# Patient Record
Sex: MALE | Race: WHITE | NOT HISPANIC OR LATINO | URBAN - METROPOLITAN AREA
[De-identification: names, ages, dates, MRNs, and addresses within clinical notes are randomized per-mention and may not be internally consistent; named-entity substitution may affect disease eponyms.]

---

## 2017-05-01 PROBLEM — N39.0 UTI (URINARY TRACT INFECTION): Status: ACTIVE | Noted: 2017-05-01

## 2017-05-07 PROBLEM — N40.0 BENIGN PROSTATIC HYPERPLASIA: Status: ACTIVE | Noted: 2017-05-07

## 2017-05-07 PROBLEM — Z87.438 HISTORY OF PROSTATITIS: Status: ACTIVE | Noted: 2017-05-07

## 2017-05-07 PROBLEM — E66.01 MORBID OBESITY WITH BODY MASS INDEX (BMI) OF 40.0 TO 44.9 IN ADULT: Status: ACTIVE | Noted: 2017-05-07

## 2017-05-18 PROBLEM — E29.1 HYPOGONADISM IN MALE: Status: ACTIVE | Noted: 2017-05-18

## 2017-05-18 PROBLEM — E78.5 HYPERLIPIDEMIA: Status: ACTIVE | Noted: 2017-05-18

## 2017-05-18 PROBLEM — R89.4 POSITIVE TEST FOR HERPES SIMPLEX VIRUS (HSV) ANTIBODY: Status: ACTIVE | Noted: 2017-05-18

## 2017-05-18 PROBLEM — R97.20 ELEVATED PSA: Status: ACTIVE | Noted: 2017-05-18

## 2018-03-08 DIAGNOSIS — M25.562 PAIN IN LEFT KNEE: Primary | ICD-10-CM

## 2018-03-19 ENCOUNTER — CLINICAL SUPPORT (OUTPATIENT)
Dept: REHABILITATION | Facility: HOSPITAL | Age: 63
End: 2018-03-19
Payer: MEDICAID

## 2018-03-19 DIAGNOSIS — M25.662 DECREASED ROM OF LEFT KNEE: ICD-10-CM

## 2018-03-19 DIAGNOSIS — M25.562 PAIN IN JOINT OF LEFT KNEE: ICD-10-CM

## 2018-03-19 DIAGNOSIS — R26.89 GAIT, ANTALGIC: ICD-10-CM

## 2018-03-19 DIAGNOSIS — M25.462 SWELLING OF JOINT, KNEE, LEFT: ICD-10-CM

## 2018-03-19 DIAGNOSIS — M54.50 LUMBAR PAIN WITH RADIATION DOWN LEFT LEG: ICD-10-CM

## 2018-03-19 DIAGNOSIS — M79.605 LUMBAR PAIN WITH RADIATION DOWN LEFT LEG: ICD-10-CM

## 2018-03-19 PROCEDURE — 97162 PT EVAL MOD COMPLEX 30 MIN: CPT | Mod: PN

## 2018-03-19 PROCEDURE — 97110 THERAPEUTIC EXERCISES: CPT | Mod: PN

## 2018-03-19 NOTE — PROGRESS NOTES
See full Physical Therapy Evaluation in POC     Review of patient's allergies indicates:  No Known Allergies  Precautions: Standard     Evaluation Date: 3/19/2018  Visit # authorized: 20  Authorization period: Pain in left knee    Functional Limitations Reports - G Codes  Category: Mobility   Intake 62% Current Status CL -    Predicted 48% Goal Status+ CK -     TREATMENT:  Sita received therapeutic exercises to develop strength and endurance, flexibility for 15 minutes including:  Quad sets 20 reps 5 sec holds   TrA 20 reps 5 sec holds   Sciatic nerve glides 20 reps x 2   Supine sciatic nerve glides 20 reps     HEP provided: Patient was given the above exercises to be completed 3 - 4 times a week   Instructed pt. Regarding: Proper technique with all exercises. Pt demo good understanding of the education provided. Sita demonstrated good return demonstration of activities.    Prognosis: Fair    Anticipated barriers to physical therapy: Chronic condition     Medical necessity is demonstrated by the following IMPAIRMENTS/PROBLEM LIST:   1) Increase in pain level limiting function    2) Decreased ROM of L knee   3) Antalgic gait   4) Swelling present in joint    5) Lack of HEP    GOALS: Short Term Goals:  6 weeks  1. Patient will be able to report decreased in resting left knee pain  <   / =  6  /10  to increase tolerance for walking and ADLs.    2. Patient will be able to report an improvement in ability to lift leg into shower to improve quality of life.   3. Patient will demonstrate an increased MMT in left hip abduction to 4/5  to increase tolerance for walking and standing.  4. Patient will be able to demonstrate an increase in 5 degrees of hamstring flexibility on L LE to improve mobility.   5. Patient will be able to tolerate HEP to improve ROM and independence with ADL's    Long Term Goals: 12 weeks  1. Patient will be able to report decreased in resting left knee pain  <   / =  4  /10  to increase  tolerance for walking and ADLs.    2. Patient will be able to report an improvement in ability to lift leg into shower to improve quality of life.   3. Patient will demonstrate an increased MMT in left hip abduction to 4+/5  to increase tolerance for walking and standing.  4. Patient will be able to demonstrate an increase in 10 degrees of hamstring flexibility on L LE to improve mobility.    5. Patient to be Independent with HEP to improve ROM and independence with ADL's

## 2018-03-19 NOTE — PLAN OF CARE
Physical Therapy Evaluation    Name: Sita Melendez  Clinic Number: [id number]    Diagnosis:   Encounter Diagnoses   Name Primary?    Lumbar pain with radiation down left leg     Decreased ROM of left knee     Pain in joint of left knee     Gait, antalgic     Swelling of joint, knee, left      Physician: Aden Garnica*  Treatment Orders: PT Eval and Treat    Past Medical History:   Diagnosis Date    Erectile dysfunction     Hyperlipidemia      Current Outpatient Prescriptions   Medication Sig    atorvastatin (LIPITOR) 40 MG tablet Take 1 tablet (40 mg total) by mouth once daily.    tamsulosin (FLOMAX) 0.4 mg Cp24 Take 1 capsule (0.4 mg total) by mouth once daily.    valacyclovir (VALTREX) 1000 MG tablet Take 1 tablet (1,000 mg total) by mouth 2 (two) times daily.     No current facility-administered medications for this visit.      Review of patient's allergies indicates:  No Known Allergies  Precautions: Standard     Evaluation Date: 3/19/2018  Visit # authorized: 20  Authorization period: Pain in left knee    Salomon Buckner is a 63 y.o. male that presents to [facility] outpatient clinic secondary to left knee pain. Patient indicates that this problem started about 6 months ago it really started hurting and he could not  his leg. Patient indicates that he had some naproxen prescribed from the doctor that helped the knee but not the back. Patient indicates that he has degenerative disc disease. Patient indicates pain in the back of the knee since he has been on the steroids on the left side around the sciatic nerve that shoots right down my leg. Patient indicates that it was just the knee and pulling the knee now it seems like two different pains    Patient c/o: intermittent symptoms  Radicular symptoms: Pain just to the back of his knee   Onset: gradual   Pain Scale: Rates pain on a scale of 0-10 to be 10 at worst; 8 currently; 5 at best .  Aggravating factors: patient indicates that  the pain in the back will occur when he is sleeping, patient indicates at night he has to straighten his leg out   Relieving factors: Laying down on a heat pad or have a friend get a rub down but it never stays, it will only stay down for a little while    Previous treatment: Steroid shots, patient indicates he has had sessions with a chiropractor and [facility],  [facility]   Imaging: none currently in chart for episode, patient indicated a desire to get another x-ray due to current pain symptoms that feels like something is loose   Past surgical history: Hernia inguinal from lifting   Functional deficits: Shower, walking, working, getting in the yard, walking on the leve, if he does perform these activities it takes a day to recover from activity   Prior level of function: First DDD 1982 but he was able to function,   Occupation: Heavy motor mechanics , work duties include: heavy lifting, patient indicates that he is currently out of work due to not being able to pass physical   Environment: 1 story home with 4 steps to enter, patient indicates that he is starting to have a bit of problem with the steps in his house   No cultural or spiritual barriers identified to treatment or learning.  Activity level/Participation: Sedentary   Patient's goals: To be normal and function as he can so he will not be scared to travel and take on the walks and normal functioning person       Objective     Observation:    Gait: Antalgic gait with decreased stance time present on left LE    Alignment: Increase in knee varus      Range of Motion:   Knee Left active Left Passive   Flexion 100 102   Extension -5      Knee Right active Right Passive   Flexion 110 110   Extension -5        Lower Extremity Strength  Right LE  Left LE    Knee extension: 5/5 Knee extension: 4/5   Knee flexion: 5/5 Knee flexion: 4/5   Hip flexion: 5/5 Hip flexion: 4+/5   Hip extension:  5/5 Hip extension: 4-/5   Hip abduction:  5/5 Hip abduction: 4-/5   Hip adduction: 5/5 Hip adduction 4-/5   Ankle dorsiflexion: 5/5 Ankle dorsiflexion: 5/5   Ankle plantarflexion: 5/5 Ankle plantarflexion: 4+/5       Special Tests:   Right Left   Valgus Stress Test Negative Negative   Varus Stress test Negative Negative   Lachman's test Negative Negative   Posterior Drawer Negative Negative   Anterior Drawer Negative Negative    Sravan's Test Negative negative   Apley's Compression Negative Positive   Apley's Distraction Negative Positive   Darling's compression test Negative Positive      Joint Mobility:   Patellar sup./inf:  Limited bilaterally    Patellar med/lat: Limited bilaterally     Palpation:    Crepitus: Present    Popliteal fossa: Patient in the posterior aspect of the knee    Quad contraction: Weak     Sensation: Intact bilateral sensation     Flexibility:      Right Left   Ely's Test  Slight Poor    Supine 90/90 WNL -20   Emily's Test Negative Negative      Slump testing: Positive on the left side  Supine SLR test: Positive on the left side     Edema: ( Joint line of knee)   42 cm right, 44 cm left       Functional Limitations Reports - G Codes  Category: Mobility   Intake 62% Current Status CL -    Predicted 48% Goal Status+ CK -     PT Evaluation Completed? Yes  Discussed Plan of Care with patient: Yes    TREATMENT:  Sita received therapeutic exercises to develop strength and endurance, flexibility for 15 minutes including:  Quad sets 20 reps 5 sec holds   TrA 20 reps 5 sec holds   Sciatic nerve glides 20 reps x 2   Supine sciatic nerve glides 20 reps     HEP provided: Patient was given the above exercises to be completed 3 - 4 times a week   Instructed pt. Regarding: Proper technique with all exercises. Pt demo good understanding of the education provided. Sita demonstrated good return demonstration of activities.      Assessment     This is a 63 y.o. male referred to outpatient physical therapy and presents with a medical  diagnosis of left knee pain and demonstrates limitations as described in the problem list. Patient presents to the clinic with multiple complaints including knee pain and back pain that radiates down into the back of his knee. Patient presents with a positive SLR and Slump testing indicating positive neurological tension. Patient also has some symptoms that radiates down to the popliteal area of his left knee. Patient also presents with decreased reflexes on the left achilles compared to the right. Patient will benefit from neurological and muscular stretching and mobility to improve his joint motion. Patient will also benefit from LE strengthening activities to improve walking tolerance and reduce antalgic gait.  Patient will benefit from physcial therapy services in order to maximize pain free and/or functional use of left knee and improvement in lumbar motion. The following goals were discussed with the patient and patient is in agreement with them as to be addressed in the treatment plan. Patient was given a HEP consisting of exercises listed above. Patient verbally understood the instructions as they were given and demonstrated proper form and technique during therapy. Patient was advised to perform these exercises free of pain, and to stop performing them if pain occurs. Patient would benefit from skilled PT to address above stated problems, as well as, achieve pt goals within a timely manner. Patient has set realistic goals and has verbalized good understanding and agreement with reported diagnosis, prognosis and treatment. Patient demonstrates no additional cultural, spiritual or educational need and currently has no barriers to learning.      History  Co-morbidities and personal factors that may impact the plan of care Examination  Body Structures and Functions, activity limitations and participation restrictions that may impact the plan of care Clinical Presentation   Decision Making/ Complexity Score    Co-morbidities:   Erectile dysfunction  Hyperlipidemia       Personal Factors:   Age 63  Occupation: Used to be a  but is now unable to perform his work duties due to pain  Lifestyle: Sedentary due to pain and limitations    Attitudes: Pleasant    Body Regions: Lumbar, LE     Body Systems: Musculoskeletal (symmetry, ROM, strength, flexibility, joint mobility), Neuromuscular (coordination, posture, balance, gait, transfers, motor control/learning), Cardiovascular (endurance)    Activity limitations: Limited in daily activities such as showering, putting on shoes and socks    Participation Restrictions: Patient unable to engage in activities that require walking or he will have pain for additional days after    Changing clinical presentation with changing clinical characteristics    Pain: 8/10 current pain   Complexity: Moderate     Functional Outcome measure  FOTO limitation: 62 % disability       Prognosis: Fair    Anticipated barriers to physical therapy: Chronic condition     Medical necessity is demonstrated by the following IMPAIRMENTS/PROBLEM LIST:   1) Increase in pain level limiting function    2) Decreased ROM of L knee   3) Antalgic gait   4) Swelling present in joint    5) Lack of HEP    GOALS: Short Term Goals:  6 weeks  1. Patient will be able to report decreased in resting left knee pain  <   / =  6  /10  to increase tolerance for walking and ADLs.    2. Patient will be able to report an improvement in ability to lift leg into shower to improve quality of life.   3. Patient will demonstrate an increased MMT in left hip abduction to 4/5  to increase tolerance for walking and standing.  4. Patient will be able to demonstrate an increase in 5 degrees of hamstring flexibility on L LE to improve mobility.   5. Patient will be able to tolerate HEP to improve ROM and independence with ADL's    Long Term Goals: 12 weeks  1. Patient will be able to report decreased in resting left knee pain  <   / =  4   /10  to increase tolerance for walking and ADLs.    2. Patient will be able to report an improvement in ability to lift leg into shower to improve quality of life.   3. Patient will demonstrate an increased MMT in left hip abduction to 4+/5  to increase tolerance for walking and standing.  4. Patient will be able to demonstrate an increase in 10 degrees of hamstring flexibility on L LE to improve mobility.    5. Patient to be Independent with HEP to improve ROM and independence with ADL's        Plan     Patient will be treated by physical therapy 1-3 times a week for 12 weeks for Electrical Stimulation PRN, Iontophoresis (with dexamethasone PRN), Manual Therapy, Moist Heat/ Ice, Neuromuscular Re-ed, Patient Education, Therapeutic Activites, Therapeutic Exercise and Other therapeutic taping, dry needling, aquatic therapy to achieve established goals. Sita may at times be seen by a PTA as part of the Rehab Team.      Cont PT for 12 weeks.      I certify the need for these services furnished under this plan of treatment and while under my care.______________________________ Physician/Referring Practitioner  Date of Signature      Emmanuelle Marrero, PT  3/19/2018

## 2018-03-22 DIAGNOSIS — M54.50 LUMBAGO: Primary | ICD-10-CM

## 2018-04-05 ENCOUNTER — CLINICAL SUPPORT (OUTPATIENT)
Dept: REHABILITATION | Facility: HOSPITAL | Age: 63
End: 2018-04-05
Payer: MEDICAID

## 2018-04-05 DIAGNOSIS — R26.89 GAIT, ANTALGIC: ICD-10-CM

## 2018-04-05 DIAGNOSIS — M25.662 DECREASED ROM OF LEFT KNEE: ICD-10-CM

## 2018-04-05 DIAGNOSIS — M25.462 SWELLING OF JOINT, KNEE, LEFT: ICD-10-CM

## 2018-04-05 DIAGNOSIS — M25.562 PAIN IN JOINT OF LEFT KNEE: ICD-10-CM

## 2018-04-05 DIAGNOSIS — M54.50 LUMBAR PAIN WITH RADIATION DOWN LEFT LEG: ICD-10-CM

## 2018-04-05 DIAGNOSIS — M79.605 LUMBAR PAIN WITH RADIATION DOWN LEFT LEG: ICD-10-CM

## 2018-04-05 PROCEDURE — 97110 THERAPEUTIC EXERCISES: CPT | Mod: PN

## 2018-04-05 NOTE — PROGRESS NOTES
Physical Therapy Daily Note   Name: Sita Melendez  Clinic Number: [id number]    Diagnosis:   Encounter Diagnoses   Name Primary?    Lumbar pain with radiation down left leg     Decreased ROM of left knee     Pain in joint of left knee     Gait, antalgic     Swelling of joint, knee, left      Physician: Aden Garnica*  Treatment Orders: PT Eval and Treat    Precautions: Standard   Visit #: 2 of 20  Eval date: 3/19/2018   G-code reported: Initial Evaluation (Visit #1)   Certification period: (3/19/2018 -6/11/2018) PN Due (4/19/2018)    Subjective   Sita reports: that he is sore today. Patient indicates that he is feeling a good bit pain in his left knee today. Patient indicates that he is not able to     Pain Scale:  7/10      Objective     Time In: 0830  Time Out:: 0930  Total Treatment time: 60 minutes (1:1 with PT for 30' of treatment session)     Patient performed the following therapeutic exercises for 50 minutes in order to strengthen and stretch left LE and Lumbar region:  LTR 3 minutes  Double knee to chest 3 minutes   Quad sets 20 reps 5 sec holds  SLR 2 sets of 10 reps   SL hip abduction 2 sets of 10 reps   SL Clams 2 sets of 15 reps   Heel slides c towel 3 minutes (10 second holds)     Heat placed on lumbar region for 3 minutes at the start of therapy session today.     Ice pack was placed on patient left knee for 10 minutes following exercises in clinic today to reduce any presence or onset of inflammation.     Written Home Exercises Provided: Patient educated to continue with previously issued HEP in order to complement therapy sessions.   Exercises were reviewed and Sita was able to demonstrate them prior to the end of the session. Patient received a written copy of exercises to perform at home. Sita demonstrated good  understanding of the education provided.     Assessment     Sita is progressing well towards his goals. Patient  demonstrates accuracy in her HEP exercises demonstrated and performed in clinic today. Patient was unable to tolerate bike exercise during today's treatment session and will refrain from this exercises for the time being. Patient tolerated motion well today because indicates greatest discomfort comes from the posterior aspect of the knee indicating poor stability. Patient will benefit from exercises that promote greater control over his knee with walking and standing activities. Patient will continue to benefit from skilled PT services in order to address limitations present in problem list and education on proper advancement in HEP.       Pt's spiritual, cultural and educational needs considered and pt agreeable to plan of care and goals.    Prognosis: Fair    Anticipated barriers to physical therapy: Chronic condition     Medical necessity is demonstrated by the following IMPAIRMENTS/PROBLEM LIST:   1) Increase in pain level limiting function    2) Decreased ROM of L knee   3) Antalgic gait   4) Swelling present in joint    5) Lack of HEP    GOALS: Short Term Goals:  6 weeks  1. Patient will be able to report decreased in resting left knee pain  <   / =  6  /10  to increase tolerance for walking and ADLs.    2. Patient will be able to report an improvement in ability to lift leg into shower to improve quality of life.   3. Patient will demonstrate an increased MMT in left hip abduction to 4/5  to increase tolerance for walking and standing.  4. Patient will be able to demonstrate an increase in 5 degrees of hamstring flexibility on L LE to improve mobility.   5. Patient will be able to tolerate HEP to improve ROM and independence with ADL's    Long Term Goals: 12 weeks  1. Patient will be able to report decreased in resting left knee pain  <   / =  4  /10  to increase tolerance for walking and ADLs.    2. Patient will be able to report an improvement in ability to lift leg into shower to improve quality of life.    3. Patient will demonstrate an increased MMT in left hip abduction to 4+/5  to increase tolerance for walking and standing.  4. Patient will be able to demonstrate an increase in 10 degrees of hamstring flexibility on L LE to improve mobility.    5. Patient to be Independent with HEP to improve ROM and independence with ADL's      Plan   Continue with established Plan of Care towards PT goals.       Emmanuelle DÍAZ Janes, PT  4/5/2018

## 2018-04-10 ENCOUNTER — CLINICAL SUPPORT (OUTPATIENT)
Dept: REHABILITATION | Facility: HOSPITAL | Age: 63
End: 2018-04-10
Payer: MEDICAID

## 2018-04-10 DIAGNOSIS — M79.605 LUMBAR PAIN WITH RADIATION DOWN LEFT LEG: ICD-10-CM

## 2018-04-10 DIAGNOSIS — M25.562 PAIN IN JOINT OF LEFT KNEE: ICD-10-CM

## 2018-04-10 DIAGNOSIS — M54.50 LUMBAR PAIN WITH RADIATION DOWN LEFT LEG: ICD-10-CM

## 2018-04-10 DIAGNOSIS — M25.662 DECREASED ROM OF LEFT KNEE: ICD-10-CM

## 2018-04-10 DIAGNOSIS — R26.89 GAIT, ANTALGIC: ICD-10-CM

## 2018-04-10 DIAGNOSIS — M25.462 SWELLING OF JOINT, KNEE, LEFT: ICD-10-CM

## 2018-04-10 PROCEDURE — 97110 THERAPEUTIC EXERCISES: CPT | Mod: PN

## 2018-04-10 PROCEDURE — 97010 HOT OR COLD PACKS THERAPY: CPT | Mod: PN

## 2018-04-10 NOTE — PROGRESS NOTES
Physical Therapy Daily Note   Name: Sita Melendez  Clinic Number: [id number]    Diagnosis:   Encounter Diagnoses   Name Primary?    Lumbar pain with radiation down left leg     Decreased ROM of left knee     Pain in joint of left knee     Gait, antalgic     Swelling of joint, knee, left      Physician: Aden Garnica*  Treatment Orders: PT Eval and Treat    Precautions: Standard   Visit #: 3 of 20  Eval date: 3/19/2018   G-code reported: Initial Evaluation (Visit #1)   Certification period: (3/19/2018 -6/11/2018) PN Due (4/19/2018)    Subjective   Sita reports: that his knee has been giving him about the same amount of discomfort that it has been. Patient indicates that he did feel a little better following last treatment session but it did not last very long.     Pain Scale:  7/10 in left knee primary       Objective     Time In: 0830  Time Out:: 0930  Total Treatment time: 60 minutes (1:1 with PT for 30' of treatment session)     Patient performed the following therapeutic exercises for 50 minutes in order to strengthen and stretch left LE and Lumbar region:  LTR 3 minutes  Double knee to chest 3 minutes   Quad sets 20 reps 5 sec holds  +Hamstring sets 20 reps 5 second holds   SLR 3 sets of 10 reps   SL hip abduction 2 sets of 10 reps   SL Clams 2 sets of 15 reps c red  Heel slides c towel 3 minutes (10 second holds)   Prone hamstring curl 2 sets of 15 reps   +Prone quad sets 2 sets of 10 reps (discomfort in back of knee)   +Seated hamstring curls 2 sets of 10 reps c red TB    Heat placed on lumbar region for 00 minutes at the start of therapy session today.     Ice pack was placed on patient left knee for 10 minutes following exercises in clinic today to reduce any presence or onset of inflammation.     Written Home Exercises Provided: Patient educated to continue with previously issued HEP in order to complement therapy sessions.   Exercises were  reviewed and Sita was able to demonstrate them prior to the end of the session. Patient received a written copy of exercises to perform at home. Sita demonstrated good  understanding of the education provided.     Assessment     Sita is progressing well towards his goals. Patient still demonstrated poor tolerance to nu step exercise during today's treatment session. Patient did not develop the discomfort until he had been on the machine for a while. Patient also showed a poor tolerance to prone quad sets during today's treatment session. Patient had fair tolerance to exercises performed in clinic today. Patient will benefit from exercises that promote greater control over his knee with walking and standing activities. Patient will continue to benefit from skilled PT services in order to address limitations present in problem list and education on proper advancement in HEP.       Pt's spiritual, cultural and educational needs considered and pt agreeable to plan of care and goals.    Prognosis: Fair    Anticipated barriers to physical therapy: Chronic condition     Medical necessity is demonstrated by the following IMPAIRMENTS/PROBLEM LIST:   1) Increase in pain level limiting function    2) Decreased ROM of L knee   3) Antalgic gait   4) Swelling present in joint    5) Lack of HEP    GOALS: Short Term Goals:  6 weeks  1. Patient will be able to report decreased in resting left knee pain  <   / =  6  /10  to increase tolerance for walking and ADLs.    2. Patient will be able to report an improvement in ability to lift leg into shower to improve quality of life.   3. Patient will demonstrate an increased MMT in left hip abduction to 4/5  to increase tolerance for walking and standing.  4. Patient will be able to demonstrate an increase in 5 degrees of hamstring flexibility on L LE to improve mobility.   5. Patient will be able to tolerate HEP to improve ROM and independence with ADL's    Long Term Goals: 12  weeks  1. Patient will be able to report decreased in resting left knee pain  <   / =  4  /10  to increase tolerance for walking and ADLs.    2. Patient will be able to report an improvement in ability to lift leg into shower to improve quality of life.   3. Patient will demonstrate an increased MMT in left hip abduction to 4+/5  to increase tolerance for walking and standing.  4. Patient will be able to demonstrate an increase in 10 degrees of hamstring flexibility on L LE to improve mobility.    5. Patient to be Independent with HEP to improve ROM and independence with ADL's      Plan   Continue with established Plan of Care towards PT goals.       Emmanuelle DÍAZ [facility], PT  4/10/2018

## 2018-04-17 ENCOUNTER — CLINICAL SUPPORT (OUTPATIENT)
Dept: REHABILITATION | Facility: HOSPITAL | Age: 63
End: 2018-04-17
Payer: MEDICAID

## 2018-04-17 DIAGNOSIS — M79.605 LUMBAR PAIN WITH RADIATION DOWN LEFT LEG: ICD-10-CM

## 2018-04-17 DIAGNOSIS — M54.50 LUMBAR PAIN WITH RADIATION DOWN LEFT LEG: ICD-10-CM

## 2018-04-17 DIAGNOSIS — R26.89 GAIT, ANTALGIC: ICD-10-CM

## 2018-04-17 DIAGNOSIS — M25.662 DECREASED ROM OF LEFT KNEE: ICD-10-CM

## 2018-04-17 DIAGNOSIS — M25.562 PAIN IN JOINT OF LEFT KNEE: ICD-10-CM

## 2018-04-17 DIAGNOSIS — M25.462 SWELLING OF JOINT, KNEE, LEFT: ICD-10-CM

## 2018-04-17 PROCEDURE — 97110 THERAPEUTIC EXERCISES: CPT | Mod: PN

## 2018-04-17 PROCEDURE — 97010 HOT OR COLD PACKS THERAPY: CPT | Mod: PN

## 2018-04-17 NOTE — PROGRESS NOTES
"                                                    Physical Therapy Daily Note   Name: Sita Melendez  Clinic Number: [id number]    Diagnosis:   Encounter Diagnoses   Name Primary?    Lumbar pain with radiation down left leg     Decreased ROM of left knee     Pain in joint of left knee     Gait, antalgic     Swelling of joint, knee, left      Physician: Aden Garnica*  Treatment Orders: PT Eval and Treat    Precautions: Standard   Visit #: 4 of 20  Eval date: 3/19/2018   G-code reported: Initial Evaluation (Visit #1)   Certification period: (3/19/2018 -6/11/2018) PN Due (5/17/2018)    Subjective   Sita reports: that his knee has been giving him about the same amount of discomfort that it has been. Patient indicates that he did feel a little better following last treatment session but it did not last very long.     Pain Scale:  7/10 in left knee primary       Objective     Time In: 0830  Time Out:: 0940  Total Treatment time: 70 minutes (1:1 with PT for duration of treatment session: 10 minutes ice)     Patient performed the following therapeutic exercises for 50 minutes in order to strengthen and stretch left LE and Lumbar region:  +Hamstring stretch 3 times 30 seconds   LTR 3 minutes  Double knee to chest 3 minutes   Quad sets 20 reps 5 sec holds  Hamstring sets 20 reps 5 second holds   SLR 3 sets of 10 reps   SL hip abduction 2 sets of 10 reps   SL Clams 2 sets of 15 reps c red  Heel slides c towel 3 minutes (10 second holds)   Prone hamstring curl 2 sets of 15 reps   Prone quad sets 2 sets of 10 reps  Seated hamstring curls 2 sets of 10 reps c red TB  +Step ups on 4" step 20 reps     Heat placed on lumbar region for 00 minutes at the start of therapy session today.     Ice pack was placed on patient left knee for 10 minutes following exercises in clinic today to reduce any presence or onset of inflammation.     Written Home Exercises Provided: Patient educated to continue with previously issued " HEP in order to complement therapy sessions.   Exercises were reviewed and Sita was able to demonstrate them prior to the end of the session. Patient received a written copy of exercises to perform at home. Sita demonstrated good  understanding of the education provided.        Range of Motion:   Knee Left active Left Passive   Flexion 100 102   Extension -5        Knee Right active Right Passive   Flexion 110 110   Extension -5           Lower Extremity Strength  Right LE   Left LE     Knee extension: 5/5 Knee extension: 5/5   Knee flexion: 5/5 Knee flexion: 5/5   Hip flexion: 5/5 Hip flexion: 5/5   Hip extension:  5/5 Hip extension: 4+/5   Hip abduction: 5/5 Hip abduction: 4+/5   Hip adduction: 5/5 Hip adduction 5/5   Ankle dorsiflexion: 5/5 Ankle dorsiflexion: 5/5   Ankle plantarflexion: 5/5 Ankle plantarflexion: 5/5        Flexibility:        Right Left   Ely's Test  Slight Poor    Supine 90/90 WNL -20   Emily's Test Negative Negative        Assessment     Sita is progressing well towards his goals. Patient engaged in exercises to promote further motor control of the knee joint. Patient attempted step up on    Patient will benefit from exercises that promote greater control over his knee with walking and standing activities. Patient will continue to benefit from skilled PT services in order to address limitations present in problem list and education on proper advancement in HEP.     Patient was reassessed during today's treatment session. Patient still presents with limitations present in ROM and strength in his left Le. Patient is most limited with his knee and the pain this brings throughout the day. Patient demonstrates poor control with his hamstring causing shifting in the knee joint. Patient will continue to benefit from activities that reduce compression in the joint and better motor control of the knee joint.     Pt's spiritual, cultural and educational needs considered and pt agreeable to plan of  care and goals.    Prognosis: Fair    Anticipated barriers to physical therapy: Chronic condition     Medical necessity is demonstrated by the following IMPAIRMENTS/PROBLEM LIST:   1) Increase in pain level limiting function    2) Decreased ROM of L knee   3) Antalgic gait   4) Swelling present in joint    5) Lack of HEP    GOALS: Short Term Goals:  6 weeks  1. Patient will be able to report decreased in resting left knee pain  <   / =  6  /10  to increase tolerance for walking and ADLs.  - In progress   2. Patient will be able to report an improvement in ability to lift leg into shower to improve quality of life. - In progress   3. Patient will demonstrate an increased MMT in left hip abduction to 4/5  to increase tolerance for walking and standing. - MET (4/17/2018)   4. Patient will be able to demonstrate an increase in 5 degrees of hamstring flexibility on L LE to improve mobility. - IN progress   5. Patient will be able to tolerate HEP to improve ROM and independence with ADL's - MET (4/17/2018)    Long Term Goals: 12 weeks  1. Patient will be able to report decreased in resting left knee pain  <   / =  4  /10  to increase tolerance for walking and ADLs.    2. Patient will be able to report an improvement in ability to lift leg into shower to improve quality of life.   3. Patient will demonstrate an increased MMT in left hip abduction to 4+/5  to increase tolerance for walking and standing.  4. Patient will be able to demonstrate an increase in 10 degrees of hamstring flexibility on L LE to improve mobility.    5. Patient to be Independent with HEP to improve ROM and independence with ADL's      Plan   Continue with established Plan of Care towards PT goals.       Emmanuelle Marrero, PT  4/17/2018

## 2018-05-08 ENCOUNTER — CLINICAL SUPPORT (OUTPATIENT)
Dept: REHABILITATION | Facility: HOSPITAL | Age: 63
End: 2018-05-08
Payer: MEDICAID

## 2018-05-08 DIAGNOSIS — M54.50 LUMBAR PAIN WITH RADIATION DOWN LEFT LEG: ICD-10-CM

## 2018-05-08 DIAGNOSIS — M25.462 SWELLING OF JOINT, KNEE, LEFT: ICD-10-CM

## 2018-05-08 DIAGNOSIS — M25.562 PAIN IN JOINT OF LEFT KNEE: ICD-10-CM

## 2018-05-08 DIAGNOSIS — R26.89 GAIT, ANTALGIC: ICD-10-CM

## 2018-05-08 DIAGNOSIS — M79.605 LUMBAR PAIN WITH RADIATION DOWN LEFT LEG: ICD-10-CM

## 2018-05-08 DIAGNOSIS — M25.662 DECREASED ROM OF LEFT KNEE: ICD-10-CM

## 2018-05-08 PROCEDURE — 97110 THERAPEUTIC EXERCISES: CPT | Mod: PN

## 2018-05-08 NOTE — PROGRESS NOTES
"                                                    Physical Therapy Daily Note   Name: Sita Melendez  Clinic Number: [id number]    Diagnosis:   Encounter Diagnoses   Name Primary?    Lumbar pain with radiation down left leg     Decreased ROM of left knee     Pain in joint of left knee     Gait, antalgic     Swelling of joint, knee, left      Physician: Aden Garnica*  Treatment Orders: PT Eval and Treat    Precautions: Standard   Visit #: 5 of 20  Eval date: 3/19/2018   G-code reported: Initial Evaluation (Visit #1)   Certification period: 3/19/2018 - 6/11/2018 (PN Due 5/17/2018)    Subjective     Sita reports: getting the results of his MRI for his Left knee and low back. Patient states that he is being referred to neurosurgery for his back first and then an orthopedic for his knee. Patient reports a lapse in PT secondary to have doctors appointments and MRI's scheduled. Patient reports some compliance with his HEP stating "I'm not sure if it's helping or hindering me."  Pain Scale:  4-5 out of 10 currently, L knee and low back.     Objective     Time In: 0825  Time Out: 0930  Total Treatment Time: 55 minutes (1:1 with PTA for 35 minutes)    MRI of lumbar spine results (5/1/2018):  1. 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   2. Moderate to severe central canal stenosis at L3-4 and to a lesser extent L4-5, and some lesser posterior foraminal encroachment on the left is noted at L4-5.  3. Small posterior left para midline disc extrusion at L5-S1 with overlying mild nerve root contact in the left lateral recess region.  4. Suspected degenerative subchondral cysts involving the iliac side of the upper right SI joint, and large body habitus with some epidural lipomatosis also noted.     MRI of Left knee results (4/30/2018):  1. " "Partial tear versus sprain of the ACL.  2. Complex tear posterior horn of medial meniscus.   3. Moderate osteoarthritis.     FOTO Knee Survey: 62% Limitation    Patient performed the following therapeutic exercises for 55 minutes in order to strengthen and stretch Left LE and lumbar region:    Supine hamstring stretch with strap: 3 x 30 seconds ea.  LTR: 3 minutes  DKTC on SB: 3 minutes   Quad sets: 20 x 5 sec holds  Hamstring sets: 20 x 5 sec holds   SLR: 3 x 10   SL hip abduction: 2 x 10   SL Clams: Red TB x 2 x 15  Heel slides with towel: 3 minutes (10 second holds)   Prone hamstring curl: 2 x 15 - not today  Prone quad sets: 2 x 10 x 5 sec hold - not today  Seated hamstring curls: Red TB x 2 x 10 - not today   Step ups on 4" step: 20x - not today    Heat placed on lumbar region for 00 minutes at the start of therapy session today.     Ice pack was placed on patient Left knee for 10 minutes following exercises in clinic today to reduce any presence or onset of inflammation.     Written Home Exercises Provided: Patient was issued an updated HEP including: LTR, quad sets, hamstring sets, SLR, SL hip abduction, and heel slides (5/8/2018)  Exercises were reviewed and Sita was able to demonstrate them prior to the end of the session. Patient received a written copy of exercises to perform at home. Aidacody demonstrated good  understanding of the education provided.     Assessment     Patient tolerated treatment session well today. Modified treatment session performed secondary to patient presentation to clinic and results of recent MRI. Good tolerance to exercises performed with no exacerbation of low back or Left knee pain. Patient would like to hold off on therapy [facility]. Patient will call to schedule future therapy appointments if advised to continue. Updated HEP given to patient this session with education on continuing LE strengthening to maintain strength " and ROM, patient verbal agreement of education provided. Patient will continue to benefit from skilled PT services in order to address limitations present in problem list and education on proper advancement in HEP.     Pt's spiritual, cultural and educational needs considered and pt agreeable to plan of care and goals.    Prognosis: Fair    Anticipated barriers to physical therapy: Chronic condition     Medical necessity is demonstrated by the following IMPAIRMENTS/PROBLEM LIST:   1) Increase in pain level limiting function    2) Decreased ROM of L knee   3) Antalgic gait   4) Swelling present in joint    5) Lack of HEP    GOALS: Short Term Goals:  6 weeks  1. Patient will be able to report decreased in resting left knee pain  <   / =  6  /10  to increase tolerance for walking and ADLs.  - In progress   2. Patient will be able to report an improvement in ability to lift leg into shower to improve quality of life. - In progress   3. Patient will demonstrate an increased MMT in left hip abduction to 4/5  to increase tolerance for walking and standing. - MET (4/17/2018)   4. Patient will be able to demonstrate an increase in 5 degrees of hamstring flexibility on L LE to improve mobility. - IN progress   5. Patient will be able to tolerate HEP to improve ROM and independence with ADL's - MET (4/17/2018)    Long Term Goals: 12 weeks  1. Patient will be able to report decreased in resting left knee pain  <   / =  4  /10  to increase tolerance for walking and ADLs.    2. Patient will be able to report an improvement in ability to lift leg into shower to improve quality of life.   3. Patient will demonstrate an increased MMT in left hip abduction to 4+/5  to increase tolerance for walking and standing.  4. Patient will be able to demonstrate an increase in 10 degrees of hamstring flexibility on L LE to improve mobility.    5. Patient to be Independent with HEP to improve ROM and independence with ADL's      Plan      Continue with established Plan of Care towards PT goals.     Radha Lagos, PTA  5/8/2018

## 2024-04-30 ENCOUNTER — TELEPHONE (OUTPATIENT)
Dept: GASTROENTEROLOGY | Facility: CLINIC | Age: 69
End: 2024-04-30
Payer: MEDICARE

## 2024-04-30 NOTE — TELEPHONE ENCOUNTER
----- Message from Nelia Cueva sent at 4/30/2024 10:02 AM CDT -----  Regarding: appt  Contact: [phone]  Pt requesting colonoscopy screening. Pls call to discuss.

## 2024-09-05 ENCOUNTER — TELEPHONE (OUTPATIENT)
Dept: ENDOSCOPY | Facility: HOSPITAL | Age: 69
End: 2024-09-05
Payer: MEDICARE

## 2024-09-05 VITALS — HEIGHT: 64 IN | WEIGHT: 225 LBS | BODY MASS INDEX: 38.41 KG/M2

## 2024-09-05 DIAGNOSIS — Z12.11 SCREEN FOR COLON CANCER: Primary | ICD-10-CM

## 2024-09-05 RX ORDER — SODIUM, POTASSIUM,MAG SULFATES 17.5-3.13G
1 SOLUTION, RECONSTITUTED, ORAL ORAL DAILY
Qty: 1 KIT | Refills: 0 | Status: SHIPPED | OUTPATIENT
Start: 2024-09-05 | End: 2024-09-07

## 2024-09-05 NOTE — TELEPHONE ENCOUNTER
----- Message from Thania Moon RN sent at 5/1/2024  1:35 PM CDT -----  Regarding: FW: Colonoscopy Referral    ----- Message -----  From: Mary Ann Obrien  Sent: 5/1/2024   9:27 AM CDT  To: [facility] Endo Schedulers  Subject: Colonoscopy Referral                             Good morning,     Current patient is being referred for a Colonoscopy by Danisha Darby. I have scanned the referral and records in to media mgr. Please contact patient to schedule and let me know if I can help any further.     Thank you,    Mary Ann MARTINEZ  Clinic   Fax: [phone]

## 2024-09-05 NOTE — TELEPHONE ENCOUNTER
Spoke to Sita to schedule procedure(s) Colonoscopy       Physician to perform procedure(s) Dr. KENZIE Shepherd   Date of Procedure (s) 10/11/24  Arrival Time 8:00 AM  Time of Procedure(s) 9:00 AM   Location of Procedure(s) [facility] 2nd Floor   Type of Rx Prep sent to patient: Suprep  Instructions provided to patient via Email    Patient was informed on the following information and verbalized understanding. Screening questionnaire reviewed with patient and complete. If procedure requires anesthesia, a responsible adult needs to be present to accompany the patient home, patient cannot drive after receiving anesthesia. Appointment details are tentative, especially check-in time. Patient will receive a prep-op call 7 days prior to confirm check-in time for procedure. If applicable the patient should contact their pharmacy to verify Rx for procedure prep is ready for pick-up. Patient was advised to call the scheduling department at [phone] if pharmacy states no Rx is available. Patient was advised to call the endoscopy scheduling department if any questions or concerns arise.      [facility]oscopy Scheduling Department

## 2024-10-03 ENCOUNTER — TELEPHONE (OUTPATIENT)
Dept: ENDOSCOPY | Facility: HOSPITAL | Age: 69
End: 2024-10-03
Payer: MEDICARE

## 2024-10-03 NOTE — TELEPHONE ENCOUNTER
Spoke to pt to reschedule procedure(s) Colonoscopy       Physician to perform procedure(s) Dr. ANTONIO Cordon  Date of Procedure (s) 12/9/24  Arrival Time 11:30 AM  Time of Procedure(s) 12:30 PM   Location of Procedure(s) [facility] 2nd Floor   Type of Rx Prep sent to patient: Suprep (pt already has)  Instructions provided to patient via [facility]    Patient was informed on the following information and verbalized understanding. Screening questionnaire reviewed with patient and complete. If procedure requires anesthesia, a responsible adult needs to be present to accompany the patient home, patient cannot drive after receiving anesthesia. Appointment details are tentative, especially check-in time. Patient will receive a prep-op call 7 days prior to confirm check-in time for procedure. If applicable the patient should contact their pharmacy to verify Rx for procedure prep is ready for pick-up. Patient was advised to call the scheduling department at [phone] if pharmacy states no Rx is available. Patient was advised to call the endoscopy scheduling department if any questions or concerns arise.      [facility] Scheduling Department

## 2024-12-02 ENCOUNTER — TELEPHONE (OUTPATIENT)
Dept: ENDOSCOPY | Facility: HOSPITAL | Age: 69
End: 2024-12-02
Payer: MEDICARE

## 2024-12-02 DIAGNOSIS — Z12.11 SCREEN FOR COLON CANCER: Primary | ICD-10-CM

## 2024-12-02 NOTE — TELEPHONE ENCOUNTER
Contacted patient for pre-call.  Patient requested cancellation and would like to reschedule at a later time.  New PAT appointment made for patient to reschedule procedure.

## 2024-12-16 ENCOUNTER — CLINICAL SUPPORT (OUTPATIENT)
Dept: ENDOSCOPY | Facility: HOSPITAL | Age: 69
End: 2024-12-16
Attending: STUDENT IN AN ORGANIZED HEALTH CARE EDUCATION/TRAINING PROGRAM
Payer: MEDICARE

## 2024-12-16 ENCOUNTER — TELEPHONE (OUTPATIENT)
Dept: ENDOSCOPY | Facility: HOSPITAL | Age: 69
End: 2024-12-16
Payer: MEDICARE

## 2024-12-16 VITALS — HEIGHT: 63 IN | WEIGHT: 224.88 LBS | BODY MASS INDEX: 39.84 KG/M2

## 2024-12-16 DIAGNOSIS — Z12.11 SCREEN FOR COLON CANCER: ICD-10-CM

## 2024-12-16 RX ORDER — SODIUM, POTASSIUM,MAG SULFATES 17.5-3.13G
1 SOLUTION, RECONSTITUTED, ORAL ORAL DAILY
Qty: 1 KIT | Refills: 0 | Status: SHIPPED | OUTPATIENT
Start: 2024-12-16 | End: 2024-12-18

## 2024-12-16 NOTE — TELEPHONE ENCOUNTER
Referral for procedure from PAT appointment      Spoke to pt to schedule procedure(s) Colonoscopy       Physician to perform procedure(s) Dr. SHYLA Ramírez  Date of Procedure (s) 2/19/25  Arrival Time 8:00 AM  Time of Procedure(s) 9:00 AM   Location of Procedure(s) [address]   Type of Rx Prep sent to patient: Suprep  Instructions provided to patient via [facility]    Patient was informed on the following information and verbalized understanding. Screening questionnaire reviewed with patient and complete. If procedure requires anesthesia, a responsible adult needs to be present to accompany the patient home, patient cannot drive after receiving anesthesia. Appointment details are tentative, especially check-in time. Patient will receive a prep-op call 7 days prior to confirm check-in time for procedure. If applicable the patient should contact their pharmacy to verify Rx for procedure prep is ready for pick-up. Patient was advised to call the scheduling department at [phone] if pharmacy states no Rx is available. Patient was advised to call the endoscopy scheduling department if any questions or concerns arise.      [facility] Scheduling Department

## 2025-02-17 ENCOUNTER — TELEPHONE (OUTPATIENT)
Dept: ENDOSCOPY | Facility: HOSPITAL | Age: 70
End: 2025-02-17
Payer: MEDICARE

## 2025-02-17 NOTE — TELEPHONE ENCOUNTER
Received Pt Called: Pt call to review prep instructions. Requesting to email them. I emailed prep instructions to [email or web address]

## 2025-02-18 ENCOUNTER — ANESTHESIA EVENT (OUTPATIENT)
Dept: ENDOSCOPY | Facility: HOSPITAL | Age: 70
End: 2025-02-18
Payer: MEDICARE

## 2025-02-19 ENCOUNTER — ANESTHESIA (OUTPATIENT)
Dept: ENDOSCOPY | Facility: HOSPITAL | Age: 70
End: 2025-02-19
Payer: MEDICARE

## 2025-02-19 ENCOUNTER — HOSPITAL ENCOUNTER (OUTPATIENT)
Facility: HOSPITAL | Age: 70
Discharge: HOME | End: 2025-02-19
Attending: STUDENT IN AN ORGANIZED HEALTH CARE EDUCATION/TRAINING PROGRAM | Admitting: STUDENT IN AN ORGANIZED HEALTH CARE EDUCATION/TRAINING PROGRAM
Payer: MEDICARE

## 2025-02-19 DIAGNOSIS — Z12.11 ENCOUNTER FOR SCREENING COLONOSCOPY: ICD-10-CM

## 2025-02-19 PROCEDURE — 27201089 HC SNARE, DISP (ANY): Performed by: STUDENT IN AN ORGANIZED HEALTH CARE EDUCATION/TRAINING PROGRAM

## 2025-02-19 PROCEDURE — 45385 COLONOSCOPY W/LESION REMOVAL: CPT | Mod: PT | Performed by: STUDENT IN AN ORGANIZED HEALTH CARE EDUCATION/TRAINING PROGRAM

## 2025-02-19 PROCEDURE — 37000009 HC ANESTHESIA EA ADD 15 MINS: Performed by: STUDENT IN AN ORGANIZED HEALTH CARE EDUCATION/TRAINING PROGRAM

## 2025-02-19 PROCEDURE — 88305 TISSUE EXAM BY PATHOLOGIST: CPT | Mod: 26,,, | Performed by: PATHOLOGY

## 2025-02-19 PROCEDURE — 88305 TISSUE EXAM BY PATHOLOGIST: CPT | Performed by: PATHOLOGY

## 2025-02-19 PROCEDURE — 45385 COLONOSCOPY W/LESION REMOVAL: CPT | Mod: PT,,, | Performed by: STUDENT IN AN ORGANIZED HEALTH CARE EDUCATION/TRAINING PROGRAM

## 2025-02-19 PROCEDURE — 63600175 PHARM REV CODE 636 W HCPCS: Performed by: STUDENT IN AN ORGANIZED HEALTH CARE EDUCATION/TRAINING PROGRAM

## 2025-02-19 PROCEDURE — 25000003 PHARM REV CODE 250: Performed by: STUDENT IN AN ORGANIZED HEALTH CARE EDUCATION/TRAINING PROGRAM

## 2025-02-19 PROCEDURE — 37000008 HC ANESTHESIA 1ST 15 MINUTES: Performed by: STUDENT IN AN ORGANIZED HEALTH CARE EDUCATION/TRAINING PROGRAM

## 2025-02-19 RX ORDER — LIDOCAINE HYDROCHLORIDE 20 MG/ML
INJECTION, SOLUTION EPIDURAL; INFILTRATION; INTRACAUDAL; PERINEURAL
Status: DISCONTINUED
Start: 2025-02-19 | End: 2025-02-19 | Stop reason: HOSPADM

## 2025-02-19 RX ORDER — LIDOCAINE HYDROCHLORIDE 20 MG/ML
INJECTION INTRAVENOUS
Status: DISCONTINUED | OUTPATIENT
Start: 2025-02-19 | End: 2025-02-19

## 2025-02-19 RX ORDER — PROPOFOL 10 MG/ML
VIAL (ML) INTRAVENOUS
Status: DISCONTINUED
Start: 2025-02-19 | End: 2025-02-19 | Stop reason: HOSPADM

## 2025-02-19 RX ORDER — PROPOFOL 10 MG/ML
VIAL (ML) INTRAVENOUS
Status: DISCONTINUED | OUTPATIENT
Start: 2025-02-19 | End: 2025-02-19

## 2025-02-19 RX ADMIN — PROPOFOL 30 MG: 10 INJECTION, EMULSION INTRAVENOUS at 09:02

## 2025-02-19 RX ADMIN — PROPOFOL 80 MG: 10 INJECTION, EMULSION INTRAVENOUS at 09:02

## 2025-02-19 RX ADMIN — PROPOFOL 40 MG: 10 INJECTION, EMULSION INTRAVENOUS at 09:02

## 2025-02-19 RX ADMIN — PROPOFOL 20 MG: 10 INJECTION, EMULSION INTRAVENOUS at 09:02

## 2025-02-19 RX ADMIN — SODIUM CHLORIDE: 0.9 INJECTION, SOLUTION INTRAVENOUS at 09:02

## 2025-02-19 RX ADMIN — LIDOCAINE HYDROCHLORIDE 100 MG: 20 INJECTION, SOLUTION INTRAVENOUS at 09:02

## 2025-02-19 NOTE — PROVATION PATIENT INSTRUCTIONS
Discharge Summary/Instructions after an Endoscopic Procedure  Patient Name: Sita Melendez  Patient MRN: [id number]  Patient YOB: 1955 Wednesday, February 19, 2025  Pablo Ramírez MD  Dear patient,  As a result of recent federal legislation (The Federal Cures Act), you may   receive lab or pathology results from your procedure in your [facility]   account before your physician is able to contact you. Your physician or   their representative will relay the results to you with their   recommendations at their soonest availability.  Thank you,  RESTRICTIONS:  During your procedure today, you received medications for sedation.  These   medications may affect your judgment, balance and coordination.  Therefore,   for 24 hours, you have the following restrictions:   - DO NOT drive a car, operate machinery, make legal/financial decisions,   sign important papers or drink alcohol.    ACTIVITY:  Today: no heavy lifting, straining or running due to procedural   sedation/anesthesia.  The following day: return to full activity including work.  DIET:  Eat and drink normally unless instructed otherwise.     TREATMENT FOR COMMON SIDE EFFECTS:  - Mild abdominal pain, nausea, belching, bloating or excessive gas:  rest,   eat lightly and use a heating pad.  - Sore Throat: treat with throat lozenges and/or gargle with warm salt   water.  - Because air was used during the procedure, expelling large amounts of air   from your rectum or belching is normal.  - If a bowel prep was taken, you may not have a bowel movement for 1-3 days.    This is normal.  SYMPTOMS TO WATCH FOR AND REPORT TO YOUR PHYSICIAN:  1. Abdominal pain or bloating, other than gas cramps.  2. Chest pain.  3. Back pain.  4. Signs of infection such as: chills or fever occurring within 24 hours   after the procedure.  5. Rectal bleeding, which would show as bright red, maroon, or black stools.   (A tablespoon of blood from the rectum is not serious, especially if    hemorrhoids are present.)  6. Vomiting.  7. Weakness or dizziness.  GO DIRECTLY TO THE NEAREST EMERGENCY ROOM IF YOU HAVE ANY OF THE FOLLOWING:      Difficulty breathing              Chills and/or fever over 101 F   Persistent vomiting and/or vomiting blood   Severe abdominal pain   Severe chest pain   Black, tarry stools   Bleeding- more than one tablespoon   Any other symptom or condition that you feel may need urgent attention  Your doctor recommends these additional instructions:  If any biopsies were taken, your doctors clinic will contact you in 1 to 2   weeks with any results.  - Discharge patient to home (ambulatory).   - Patient has a contact number available for emergencies.  The signs and   symptoms of potential delayed complications were discussed with the   patient.  Return to normal activities tomorrow.  Written discharge   instructions were provided to the patient.   - Resume previous diet.   - Continue present medications.   - Return to primary care physician as previously scheduled.   - Repeat colonoscopy for surveillance based on pathology results.   Surveillance interval will be determined after pathology results reviewed.     For questions, problems or results please call your physician - Pablo Ramírez MD at Work:  [phone].  [facility] can be reached at [phone]     IF A COMPLICATION OR EMERGENCY SITUATION ARISES AND YOU ARE UNABLE TO REACH   YOUR PHYSICIAN - GO DIRECTLY TO THE EMERGENCY ROOM.  MD Pablo Antunez MD  2/19/2025 10:00:48 AM  This report has been verified and signed electronically.  Dear patient,  As a result of recent federal legislation (The Federal Cures Act), you may   receive lab or pathology results from your procedure in your [facility]   account before your physician is able to contact you. Your physician or   their representative will relay the results to you with their   recommendations at their soonest  availability.  Thank you,  PROVATION

## 2025-02-19 NOTE — H&P
Short Stay Endoscopy History and Physical    PCP - Aden Garnica MD    Procedure - Colonoscopy  ASA - per anesthesia  Mallampati - per anesthesia  History of Anesthesia problems - no  Family history Anesthesia problems -  no   Plan of anesthesia - General    HPI:  This is a 70 y.o. male here for evaluation of :     Colorectal cancer screening      Medical History:  has a past medical history of Erectile dysfunction and Hyperlipidemia.    Surgical History:  has a past surgical history that includes Prostate biopsy and Inguinal hernia repair.    Family History: family history includes Cancer in his brother; Heart disease in his brother.. Otherwise no colon cancer, inflammatory bowel disease, or GI malignancies.    Social History:  reports that he quit smoking about 46 years ago. His smoking use included cigarettes. He has never used smokeless tobacco. He reports that he does not drink alcohol and does not use drugs.    Review of patient's allergies indicates:  No Known Allergies    Medications:   Prescriptions Prior to Admission[1]      Physical Exam:    Vital Signs:   Vitals:    02/19/25 0821   BP: (!) 172/81   Pulse: 77   Resp: 18   Temp: 98.1 °F (36.7 °C)       General Appearance: Well appearing in no acute distress  Head: Normocephalic, without obvious abnormality   Lungs: Non-labored breathing  Abdomen: Soft, non tender, non distended     Labs:  Lab Results   Component Value Date    WBC 4.1 05/05/2017    HGB 13.4 05/05/2017    HCT 39.2 05/05/2017     05/05/2017    CHOL 208 (H) 05/05/2017    TRIG 151 (H) 05/05/2017    HDL 44 05/05/2017    ALT 30 05/05/2017    AST 27 05/05/2017     05/24/2024    K 4.1 05/24/2024     05/05/2017    CREATININE 0.82 05/24/2024    BUN 7.0 05/24/2024    CO2 25 05/24/2024    HGBA1C 5.9 (H) 05/05/2017       I have explained the risks and benefits of endoscopy procedures to the patient including but not limited to bleeding, perforation, infection, and  death.  The patient was asked if they understand and allowed to ask any further questions to their satisfaction.    Pablo Ramírez MD        [1]   Medications Prior to Admission   Medication Sig Dispense Refill Last Dose/Taking    atorvastatin (LIPITOR) 40 MG tablet Take 1 tablet (40 mg total) by mouth once daily. 30 tablet 6     tamsulosin (FLOMAX) 0.4 mg Cp24 Take 1 capsule (0.4 mg total) by mouth once daily. 90 capsule 3     valacyclovir (VALTREX) 1000 MG tablet Take 1 tablet (1,000 mg total) by mouth 2 (two) times daily. 14 tablet 0

## 2025-02-19 NOTE — ANESTHESIA PREPROCEDURE EVALUATION
02/19/2025  Sita Melendez is a 70 y.o., male.      Pre-op Assessment    I have reviewed the Patient Summary Reports.     I have reviewed the Nursing Notes. I have reviewed the NPO Status.   I have reviewed the Medications.     Review of Systems  Anesthesia Hx:  No problems with previous Anesthesia             Denies Family Hx of Anesthesia complications.    Denies Personal Hx of Anesthesia complications.                    Social:  Former Smoker, No Alcohol Use       Hematology/Oncology:  Hematology Normal   Oncology Normal                                   EENT/Dental:  EENT/Dental Normal           Cardiovascular:                hyperlipidemia                               Renal/:  Renal/ Normal                 Hepatic/GI:  Hepatic/GI Normal                    Musculoskeletal:  Musculoskeletal Normal                Neurological:  Neurology Normal                                      Endocrine:        Morbid Obesity / BMI > 40  Psych:  Psychiatric Normal                    Physical Exam  General: Oriented, Alert and Cooperative    Airway:  Mallampati: III / II  Mouth Opening: Normal  TM Distance: Normal  Tongue: Normal  Neck ROM: Normal ROM    Dental:  Intact        Anesthesia Plan  Type of Anesthesia, risks & benefits discussed:    Anesthesia Type: Gen Natural Airway  Intra-op Monitoring Plan: Standard ASA Monitors  Induction:  IV  Informed Consent: Informed consent signed with the Patient and all parties understand the risks and agree with anesthesia plan.  All questions answered. Patient consented to blood products? No  ASA Score: 3    Ready For Surgery From Anesthesia Perspective.     .

## 2025-02-19 NOTE — ANESTHESIA POSTPROCEDURE EVALUATION
Anesthesia Post Evaluation    Patient: Sita Melendez    Procedure(s) Performed: Procedure(s) (LRB):  COLONOSCOPY (N/A)    Final Anesthesia Type: general      Patient location during evaluation: GI PACU  Patient participation: Yes- Able to Participate  Level of consciousness: awake and alert  Post-procedure vital signs: reviewed and stable  Airway patency: patent    PONV status at discharge: No PONV  Anesthetic complications: no      Cardiovascular status: blood pressure returned to baseline and hemodynamically stable  Respiratory status: unassisted, spontaneous ventilation and room air  Hydration status: euvolemic  Follow-up not needed.              Vitals Value Taken Time   /82 02/19/25 10:27   Temp 36.5 °C (97.7 °F) 02/19/25 09:57   Pulse 60 02/19/25 10:27   Resp 16 02/19/25 10:27   SpO2 98 % 02/19/25 10:27         Event Time   Out of Recovery 10:28:37         Pain/Ally Score: Ally Score: 10 (2/19/2025 10:27 AM)

## 2025-02-19 NOTE — TRANSFER OF CARE
Anesthesia Transfer of Care Note    Patient: Sita Melendez    Procedure(s) Performed: Procedure(s) (LRB):  COLONOSCOPY (N/A)    Patient location: GI    Anesthesia Type: general    Transport from OR: Transported from OR on room air with adequate spontaneous ventilation    Post pain: adequate analgesia    Post assessment: no apparent anesthetic complications    Level of consciousness: responds to stimulation and lethargic    Nausea/Vomiting: no nausea/vomiting    Complications: none    Transfer of care protocol was followed      Last vitals: Visit Vitals  BP (!) 147/72 (BP Location: Left arm, Patient Position: Lying)   Pulse 68   Temp 36.5 °C (97.7 °F) (Oral)   Resp 15   SpO2 98%

## 2025-02-20 LAB
FINAL PATHOLOGIC DIAGNOSIS: NORMAL
GROSS: NORMAL
Lab: NORMAL

## 2025-02-24 ENCOUNTER — RESULTS FOLLOW-UP (OUTPATIENT)
Dept: GASTROENTEROLOGY | Facility: HOSPITAL | Age: 70
End: 2025-02-24
Payer: MEDICARE

## 2025-02-26 VITALS
OXYGEN SATURATION: 98 % | RESPIRATION RATE: 16 BRPM | HEART RATE: 60 BPM | SYSTOLIC BLOOD PRESSURE: 177 MMHG | DIASTOLIC BLOOD PRESSURE: 82 MMHG | TEMPERATURE: 98 F